# Patient Record
Sex: FEMALE | Race: BLACK OR AFRICAN AMERICAN | NOT HISPANIC OR LATINO | Employment: FULL TIME | URBAN - METROPOLITAN AREA
[De-identification: names, ages, dates, MRNs, and addresses within clinical notes are randomized per-mention and may not be internally consistent; named-entity substitution may affect disease eponyms.]

---

## 2020-09-19 ENCOUNTER — HOSPITAL ENCOUNTER (EMERGENCY)
Facility: HOSPITAL | Age: 65
Discharge: HOME | End: 2020-09-20
Attending: EMERGENCY MEDICINE
Payer: COMMERCIAL

## 2020-09-19 DIAGNOSIS — S20.211A CONTUSION OF RIGHT CHEST WALL, INITIAL ENCOUNTER: ICD-10-CM
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
## 2020-09-19 PROCEDURE — 99283 EMERGENCY DEPT VISIT LOW MDM: CPT | Mod: 25

## 2020-09-20 VITALS
BODY MASS INDEX: 29.06 KG/M2 | WEIGHT: 148 LBS | HEIGHT: 60 IN | DIASTOLIC BLOOD PRESSURE: 62 MMHG | HEART RATE: 63 BPM | OXYGEN SATURATION: 98 % | SYSTOLIC BLOOD PRESSURE: 138 MMHG | TEMPERATURE: 98 F | RESPIRATION RATE: 17 BRPM

## 2020-09-20 RX ORDER — METHOCARBAMOL 500 MG/1
1000 TABLET, FILM COATED ORAL 3 TIMES DAILY PRN
Qty: 20 TABLET | Refills: 0 | Status: SHIPPED | OUTPATIENT
Start: 2020-09-20 | End: 2020-09-25

## 2020-09-20 NOTE — DISCHARGE INSTRUCTIONS
Tylenol or ibuprofen as needed for pain.  Robaxin for any stiffness/soreness. Be aware, this medication is sedating.  Do not mix with alcohol or any other sedating medications.  Do not drive or operate machinery when taking this medication.     Please follow-up with your primary care provider for re-evaluation should experience persistent pain.  Immediately return to this ED if you experience worsening pain, if you begin with shortness of breath, if you begin with severe chest pain, if any other problems occur.

## 2020-09-20 NOTE — ED NOTES
Pt wants to know how long she will be here, I explained xray would be in as soon as they can. Pt verbalized understanding and denies other needs at this time.

## 2020-09-21 NOTE — ED PROVIDER NOTES
Encounter Date: 9/19/2020       History     Chief Complaint   Patient presents with    Fall     Patient c/o right rib pain, right hip pain, and right shoulder pain secondary to a fall when friend fell onto her on escalator x 2 hours.  Ambulatory without difficulty.  No obvious deformities noted.  Denies neck or back pain.  A&O x 4 with GCS 15.     64-year-old female complaining of right-sided rib pain after trauma prior to arrival.  Patient was going up an escalator, the friend standing in front of her slipped and fell backwards onto the patient.  The patient try to catch her, her right-sided rib struck the railing of the escalator.  No head injury or LOC, no neck or back pain.  No previous injury.  No shortness of breath or chest pain.  There is pleuritic pain to the right-sided ribs with deep inspiration.  No open wound.  No rash.    History of right hip replacement.  Denies any pelvic or hip pain with ambulation and weight-bearing, denies pain to her hip.        Review of patient's allergies indicates:  No Known Allergies  History reviewed. No pertinent past medical history.  Past Surgical History:   Procedure Laterality Date    JOINT REPLACEMENT       History reviewed. No pertinent family history.  Social History     Tobacco Use    Smoking status: Never Smoker    Smokeless tobacco: Never Used   Substance Use Topics    Alcohol use: Yes     Frequency: Never    Drug use: Not on file     Review of Systems   HENT: Negative for facial swelling.    Respiratory: Negative for cough, chest tightness, shortness of breath and wheezing.    Cardiovascular:        Right chest wall pain   Gastrointestinal: Negative for abdominal pain, nausea and vomiting.   Musculoskeletal: Negative for arthralgias, back pain, neck pain and neck stiffness.   Skin: Negative for wound.   Neurological: Negative for dizziness, light-headedness, numbness and headaches.       Physical Exam     Initial Vitals [09/19/20 2239]   BP Pulse Resp Temp  SpO2   132/62 88 14 98.2 °F (36.8 °C) 100 %      MAP       --         Physical Exam    Nursing note and vitals reviewed.  Constitutional: She appears well-developed and well-nourished. She is not diaphoretic. No distress.   HENT:   Head: Normocephalic and atraumatic.   Eyes: EOM are normal.   Neck: Neck supple.   Cardiovascular: Intact distal pulses.   Pulmonary/Chest: Breath sounds normal. No respiratory distress. She has no wheezes. She has no rhonchi.   R midaxillary mid rib ttp; no bony deformity; pain to this region with deep inspiration   Abdominal: There is no abdominal tenderness.   Musculoskeletal: Normal range of motion. No tenderness.   Neurological: She is alert and oriented to person, place, and time.   Skin: Skin is warm.   Psychiatric: She has a normal mood and affect. Thought content normal.         ED Course   Procedures  Labs Reviewed - No data to display       Imaging Results          X-Ray Chest PA And Lateral (Final result)  Result time 09/20/20 00:31:40    Final result by Stephanie Chinchilla MD (09/20/20 00:31:40)                 Impression:      No acute intrathoracic abnormality.      Electronically signed by: Stephanie Chinchilla  Date:    09/20/2020  Time:    00:31             Narrative:    EXAMINATION:  CHEST PA AND LATERAL    CLINICAL HISTORY:  Unspecified fall, initial encounter    TECHNIQUE:  PA and lateral chest radiograph    COMPARISON:  <None.>    FINDINGS:  The cardiac silhouette is within normal limits.   There is no focal consolidation, pneumothorax, or pleural effusion.                               X-Ray Ribs 2 View Right (Final result)  Result time 09/20/20 00:32:24    Final result by Stephanie Chinchilla MD (09/20/20 00:32:24)                 Impression:      No displaced right rib fractures.      Electronically signed by: Stephanie Chinchilla  Date:    09/20/2020  Time:    00:32             Narrative:    EXAMINATION:  TWO VIEWS OF THE RIGHT RIBS    CLINICAL HISTORY:  Unspecified fall,  initial encounter    TECHNIQUE:  Two views of the right ribs are submitted.    COMPARISON:  None.    FINDINGS:  Two views of the right ribs demonstrate no displaced right rib fractures.                               X-Ray Shoulder Trauma Right (Final result)  Result time 09/20/20 00:33:15    Final result by Stephanie Chinchilla MD (09/20/20 00:33:15)                 Impression:      No acute bony abnormality detected.      Electronically signed by: Stephanie Chinchilla  Date:    09/20/2020  Time:    00:33             Narrative:    EXAMINATION:  THREE VIEWS OF THE RIGHT SHOULDER    CLINICAL HISTORY:  Unspecified fall, initial encounter    TECHNIQUE:  AP, oblique, and lateral view of the right shoulder    COMPARISON:  None.    FINDINGS:  Three views of the right shoulder demonstrate no acute fracture or dislocation.                                 Medical Decision Making:   Initial Assessment:   64-year-old male with right-sided rib pain after trauma prior to arrival.  Differential Diagnosis:   Fracture, contusion, sprain/strain, pneumothorax, pleural effusion  Clinical Tests:   Radiological Study: Ordered and Reviewed  ED Management:  No acute fracture.  Supportive measures.                             Clinical Impression:       ICD-10-CM ICD-9-CM   1. Fall  W19.XXXA E888.9   2. Contusion of right chest wall, initial encounter  S20.211A 922.1                          ED Disposition Condition    Discharge Stable        ED Prescriptions     Medication Sig Dispense Start Date End Date Auth. Provider    methocarbamoL (ROBAXIN) 500 MG Tab Take 2 tablets (1,000 mg total) by mouth 3 (three) times daily as needed (Muscle stiffness/soreness). 20 tablet 9/20/2020 9/25/2020 Anoop Pulliam PA-C        Follow-up Information     Follow up With Specialties Details Why Contact Info    Primary care provider  Schedule an appointment as soon as possible for a visit  For reevaluation, If symptoms persist                                         Anoop Pulliam PA-C  09/21/20 0540